# Patient Record
Sex: FEMALE | Race: OTHER | NOT HISPANIC OR LATINO
[De-identification: names, ages, dates, MRNs, and addresses within clinical notes are randomized per-mention and may not be internally consistent; named-entity substitution may affect disease eponyms.]

---

## 2020-09-01 ENCOUNTER — APPOINTMENT (OUTPATIENT)
Dept: OBGYN | Facility: CLINIC | Age: 70
End: 2020-09-01
Payer: MEDICARE

## 2020-09-01 PROCEDURE — G0101: CPT

## 2020-09-01 PROCEDURE — 99212 OFFICE O/P EST SF 10 MIN: CPT | Mod: 25

## 2020-09-01 PROCEDURE — 81002 URINALYSIS NONAUTO W/O SCOPE: CPT

## 2020-09-09 PROBLEM — Z00.00 ENCOUNTER FOR PREVENTIVE HEALTH EXAMINATION: Status: ACTIVE | Noted: 2020-09-09

## 2020-09-22 ENCOUNTER — APPOINTMENT (OUTPATIENT)
Dept: OBGYN | Facility: CLINIC | Age: 70
End: 2020-09-22
Payer: MEDICARE

## 2020-09-22 PROCEDURE — 99213 OFFICE O/P EST LOW 20 MIN: CPT

## 2020-09-22 PROCEDURE — 81002 URINALYSIS NONAUTO W/O SCOPE: CPT

## 2020-10-22 ENCOUNTER — APPOINTMENT (OUTPATIENT)
Dept: UROGYNECOLOGY | Facility: CLINIC | Age: 70
End: 2020-10-22
Payer: MEDICARE

## 2020-10-22 VITALS
HEIGHT: 66 IN | SYSTOLIC BLOOD PRESSURE: 123 MMHG | DIASTOLIC BLOOD PRESSURE: 73 MMHG | BODY MASS INDEX: 29.73 KG/M2 | HEART RATE: 66 BPM | WEIGHT: 185 LBS

## 2020-10-22 DIAGNOSIS — Z78.9 OTHER SPECIFIED HEALTH STATUS: ICD-10-CM

## 2020-10-22 DIAGNOSIS — N81.2 INCOMPLETE UTEROVAGINAL PROLAPSE: ICD-10-CM

## 2020-10-22 LAB
BILIRUB UR QL STRIP: NEGATIVE
CLARITY UR: CLEAR
COLLECTION METHOD: NORMAL
GLUCOSE UR-MCNC: NEGATIVE
HCG UR QL: NORMAL EU/DL
HGB UR QL STRIP.AUTO: NEGATIVE
KETONES UR-MCNC: NEGATIVE
LEUKOCYTE ESTERASE UR QL STRIP: NEGATIVE
NITRITE UR QL STRIP: NEGATIVE
PH UR STRIP: 6
PROT UR STRIP-MCNC: NEGATIVE
SP GR UR STRIP: 1.01

## 2020-10-22 PROCEDURE — 99205 OFFICE O/P NEW HI 60 MIN: CPT

## 2020-10-22 PROCEDURE — 51701 INSERT BLADDER CATHETER: CPT

## 2020-10-22 PROCEDURE — 81003 URINALYSIS AUTO W/O SCOPE: CPT | Mod: QW

## 2020-10-22 PROCEDURE — 99215 OFFICE O/P EST HI 40 MIN: CPT

## 2020-10-22 NOTE — PHYSICAL EXAM
[Chaperone Present] : A chaperone was present in the examining room during all aspects of the physical examination [FreeTextEntry1] : Void: 200 cc\par PVR: 405 cc\par Urethra was prepped in sterile fashion and then a sterile catheter was used by me to drain the bladder.\par \par GH: 6.5    pB: 4  TVL: 10  C: +7.5  D: -5 Aa: +3  Ba: +7.5  Ap: 0 Bp: 0\par  \par Well healed incision: mini vertical, laparoscopic\par normal perineal sensation\par normal perineal reflexes\par negative cough stress test with and without reduction\par positive atrophy\par positive urethral hypermobility\par bilateral levator ani spasm, mild tenderness\par no urethral tenderness\par no bladder tenderness\par no cervical tenderness\par 1/5 Kegel

## 2020-10-22 NOTE — COUNSELING
[FreeTextEntry1] : \par We will notify you of the urine results. If there is an infection we will treat the infection and then see if you empty your bladder better without an infection\par \par Schedule bladder function testing with my PA, Elayne (UDS with reduction), the earliest in 10 days from now.  Please come with a full bladder\par \par Please call the office if you feel like you have an infection because we can not do the bladder function testing in the setting of an infection\par \par Please schedule a surgical counseling visit with me to discuss surgery\par \par Please review the surgical handouts

## 2020-10-22 NOTE — DISCUSSION/SUMMARY
[FreeTextEntry1] : \par Uterovaginal prolapse incomplete-\par The patient was counseled regarding the possible natural progression of prolapse and the clinical consequences of worsening prolapse. The stage and the location of the prolapse was reviewed with the patient. She was counseled regarding the management strategies including observation (but if the prolapse is the cause of her not emptying well then she could develop kidney damage), pessary placement and surgery (robotic hysterectomy/sacrocolpopexy). The patient voiced understanding and agrees with the surgical plan. She will be scheduled for preoperative urodynamics and preoperative surgical counseling.\par \par Incomplete bladder emptying-\par Advised the patient that this can be due to an acute UTI. Will send her urine to rule out infectious etiology.  I will also recommend further workup with urodynamics (with reduction). Discussed with the patient that we are concerned about incomplete bladder emptying because it can cause recurrent UTI and can cause kidney damage. The patient voiced understanding.\par \par \par \par

## 2020-10-22 NOTE — HISTORY OF PRESENT ILLNESS
[FreeTextEntry1] : \par Pt with pelvic floor dysfunction here for urogynecologic evaluation. She describes: \par Referring provider: Dr Carlos Enrique Salas\par \par Chief PFD: bulge\par \par 9/1/20: pap normal, HPV neg\par 9/1/20: pelvic ultrasound: uterus 6.9cm, endometrila lining 5mm, surgically absent ovaries\par \par Pelvic organ prolapse: s/p LSC appy/BS0 colectomy (mass in bowel and cysts in ovaries excision), +bulge worsening for last few months, +splinting for bowel movements\par Stress urinary incontinence: denies\par Overactive bladder syndrome: denies\par Voiding dysfunction: denies Incomplete bladder emptying, minimal hesitancy \par Lower urinary tract/vaginal symptoms: no UTIs past year; denies hematuria, dysuria, or bladder pain \par Fecal incontinence: denies\par Defecatory dysfunction: sausage\par Sexual dysfunction: sexually active, no issues\par Pelvic pain: denies\par Vaginal dryness: denies\par \par Her pelvic floor symptoms are significantly bothersome and negatively impacting her quality of life. \par

## 2020-10-23 LAB
APPEARANCE: CLEAR
BILIRUBIN URINE: NEGATIVE
BLOOD URINE: NEGATIVE
COLOR: NORMAL
GLUCOSE QUALITATIVE U: NEGATIVE
KETONES URINE: NEGATIVE
LEUKOCYTE ESTERASE URINE: NEGATIVE
NITRITE URINE: NEGATIVE
PH URINE: 6
PROTEIN URINE: NEGATIVE
SPECIFIC GRAVITY URINE: 1.01
UROBILINOGEN URINE: NORMAL

## 2020-10-24 LAB — BACTERIA UR CULT: NORMAL

## 2020-11-09 ENCOUNTER — APPOINTMENT (OUTPATIENT)
Dept: UROGYNECOLOGY | Facility: CLINIC | Age: 70
End: 2020-11-09

## 2020-12-17 ENCOUNTER — APPOINTMENT (OUTPATIENT)
Dept: UROGYNECOLOGY | Facility: CLINIC | Age: 70
End: 2020-12-17

## 2020-12-28 ENCOUNTER — APPOINTMENT (OUTPATIENT)
Dept: UROGYNECOLOGY | Facility: CLINIC | Age: 70
End: 2020-12-28

## 2021-01-13 ENCOUNTER — NON-APPOINTMENT (OUTPATIENT)
Age: 71
End: 2021-01-13

## 2021-01-13 LAB — CYTOLOGY CVX/VAG DOC THIN PREP: NORMAL

## 2021-03-22 ENCOUNTER — RESULT CHARGE (OUTPATIENT)
Age: 71
End: 2021-03-22

## 2021-03-22 ENCOUNTER — APPOINTMENT (OUTPATIENT)
Dept: UROGYNECOLOGY | Facility: CLINIC | Age: 71
End: 2021-03-22
Payer: MEDICARE

## 2021-03-22 VITALS
HEART RATE: 66 BPM | SYSTOLIC BLOOD PRESSURE: 127 MMHG | BODY MASS INDEX: 30.53 KG/M2 | HEIGHT: 66 IN | DIASTOLIC BLOOD PRESSURE: 76 MMHG | WEIGHT: 190 LBS

## 2021-03-22 LAB
BILIRUB UR QL STRIP: NEGATIVE
CLARITY UR: CLEAR
COLLECTION METHOD: NORMAL
GLUCOSE UR-MCNC: NEGATIVE
HCG UR QL: 0.2 EU/DL
HGB UR QL STRIP.AUTO: NEGATIVE
KETONES UR-MCNC: NEGATIVE
LEUKOCYTE ESTERASE UR QL STRIP: NEGATIVE
NITRITE UR QL STRIP: NEGATIVE
PH UR STRIP: 5.5
SP GR UR STRIP: 1.02

## 2021-03-22 PROCEDURE — 81003 URINALYSIS AUTO W/O SCOPE: CPT | Mod: QW

## 2021-03-22 PROCEDURE — 51784 ANAL/URINARY MUSCLE STUDY: CPT

## 2021-03-22 PROCEDURE — 51728 CYSTOMETROGRAM W/VP: CPT

## 2021-03-22 PROCEDURE — 51741 ELECTRO-UROFLOWMETRY FIRST: CPT

## 2021-03-22 PROCEDURE — ZZZZZ: CPT

## 2021-03-22 PROCEDURE — 51797 INTRAABDOMINAL PRESSURE TEST: CPT

## 2021-03-25 ENCOUNTER — APPOINTMENT (OUTPATIENT)
Dept: UROGYNECOLOGY | Facility: CLINIC | Age: 71
End: 2021-03-25
Payer: MEDICARE

## 2021-03-25 VITALS
HEART RATE: 64 BPM | WEIGHT: 190 LBS | SYSTOLIC BLOOD PRESSURE: 128 MMHG | BODY MASS INDEX: 30.53 KG/M2 | HEIGHT: 66 IN | DIASTOLIC BLOOD PRESSURE: 67 MMHG

## 2021-03-25 PROCEDURE — 57160 INSERT PESSARY/OTHER DEVICE: CPT

## 2021-03-25 RX ORDER — CONJUGATED ESTROGENS 0.62 MG/G
0.62 CREAM VAGINAL
Qty: 1 | Refills: 3 | Status: ACTIVE | COMMUNITY
Start: 2021-03-25 | End: 1900-01-01

## 2021-03-25 NOTE — DISCUSSION/SUMMARY
[FreeTextEntry1] : Complete Uterovaginal Prolapse:\par Ring and support #6  placed without difficulty. Remained in place with Valsalva, coughing, and ambulating. \par The patient tolerated all fittings well.\par Will return for placement when pessary is ordered and arrives to the office.\par \par Incomplete bladder emptying\par Today PVR: 240cc\par Will repeat PVR with pessary in for 3 weeks\par Will review UDS results at next visit\par \par Atrophic Vaginitis\par We reviewed the risks, benefits, alternatives and indications of local estrogen therapy and I gave her a handout that covers this information. She does opt to begin this therapy. I have given her a prescription and specific instructions on how to use the estrogen cream, applied with a finger at a low dose for urogenital atrophy.\par

## 2021-03-25 NOTE — HISTORY OF PRESENT ILLNESS
[FreeTextEntry1] : Patient is here for pessary fitting. GH:6.5  TVL: 10\par Last seen 3/22/21 for UDS. \par \par 9/1/20: pap normal, HPV neg\par 9/1/20: pelvic ultrasound: uterus 6.9cm, endometrila lining 5mm, surgically absent ovaries\par \par s/p LSC appy/BS0 colectomy (mass in bowel and cysts in ovaries excision)\par sexually active, no issues\par \par GH: 6.5 pB: 4 TVL: 10 C: +7.5 D: -5 Aa: +3 Ba: +7.5 Ap: 0 Bp: 0\par \par Incomplete bladder emptying without reduction. PVR: 405cc\par \par Candidate for robotic hysterectomy sacrocolpopexy.\par \par Patient wants to wait a few months before scheduling the surgery due to COVID. Pt wants to have pessary at this time for prolapse management\par \par Today pt feels well, has no complains. \par \par \par \par

## 2021-04-01 ENCOUNTER — APPOINTMENT (OUTPATIENT)
Dept: UROGYNECOLOGY | Facility: CLINIC | Age: 71
End: 2021-04-01
Payer: MEDICARE

## 2021-04-01 VITALS
HEART RATE: 64 BPM | BODY MASS INDEX: 30.53 KG/M2 | WEIGHT: 190 LBS | DIASTOLIC BLOOD PRESSURE: 80 MMHG | HEIGHT: 66 IN | SYSTOLIC BLOOD PRESSURE: 146 MMHG

## 2021-04-01 PROCEDURE — 99213 OFFICE O/P EST LOW 20 MIN: CPT

## 2021-04-02 NOTE — DISCUSSION/SUMMARY
[FreeTextEntry1] : \par Incomplete Uterovaginal Prolapse:\par Ring and support #6 was inserted without difficulty. Pessary remained in place with valsalva, coughing, and ambulating.\par The patient tolerated this well. \par Precautions given.\par The patient will follow up in 3 weeks for routine pessary management and self teaching.\par \par Atrophic vaginitis\par cont premarin cream 3x a week

## 2021-04-02 NOTE — COUNSELING
[FreeTextEntry1] : Please call the office if you have any issues with vaginal pain, vaginal bleeding, difficulty urinating or having a bowel movement or if the pessary falls out so that we can arrange another size pessary.\par \par Please follow up for pessary maintenance in 3 weeks with TABBY Holly\[place]

## 2021-04-02 NOTE — HISTORY OF PRESENT ILLNESS
[FreeTextEntry1] : Patient is here for pessary placement.\par Last seen 3/25/21 for pessary fitting, ring and support # 6 and PVR: 240cc\par \par 9/1/20: pap normal, HPV neg\par 9/1/20: pelvic ultrasound: uterus 6.9cm, endometrila lining 5mm, surgically absent ovaries\par \par s/p LSC appy/BS0 colectomy (mass in bowel and cysts in ovaries excision)\par sexually active, no issues\par \par GH: 6.5 pB: 4 TVL: 10 C: +7.5 D: -5 Aa: +3 Ba: +7.5 Ap: 0 Bp: 0\par \par 1st visit, Incomplete bladder emptying without reduction. PVR: 405cc\par \par Candidate for robotic hysterectomy sacrocolpopexy.\par \par Patient wants to wait a few months before scheduling the surgery due to COVID. Pt wants to have pessary at this time for prolapse management\par \par Fitted: \par Ring and support #6\par \par Premarin cream \par \par Patient is doing well and has no complaints today.\par

## 2021-04-05 ENCOUNTER — APPOINTMENT (OUTPATIENT)
Dept: UROGYNECOLOGY | Facility: CLINIC | Age: 71
End: 2021-04-05
Payer: MEDICARE

## 2021-04-05 VITALS
SYSTOLIC BLOOD PRESSURE: 129 MMHG | WEIGHT: 190 LBS | BODY MASS INDEX: 30.53 KG/M2 | HEIGHT: 66 IN | HEART RATE: 75 BPM | DIASTOLIC BLOOD PRESSURE: 76 MMHG

## 2021-04-05 PROCEDURE — 57160 INSERT PESSARY/OTHER DEVICE: CPT

## 2021-04-05 NOTE — DISCUSSION/SUMMARY
[FreeTextEntry1] : \par Incomplete Uterovaginal Prolapse:\par Ring and support # 7 placed without difficulty. Remained in place with Valsalva, coughing, and ambulating. \par The patient tolerated all fittings well.\par Will return for placement when pessary is ordered and arrives to the office.\par \par Pt's own pessary ring and support # 6 cleaned and reinserted today. \par \par Atrophic vaginitis\par cont premarin cream 3x a week\par \par Incomplete bladder emptying\par Will repeat PVR with pessary in place for 2-3 weeks

## 2021-04-05 NOTE — COUNSELING
[FreeTextEntry1] : \par Lili will contact you when the pessary arrives in the office. She will then schedule an appointment for pessary placement. (Ring and support #7)\par \par Please call the office with any questions, issues, or concerns.\par

## 2021-04-05 NOTE — HISTORY OF PRESENT ILLNESS
[FreeTextEntry1] : Patient is here for second pessary fitting. GH: 6.5 TVL: 10\par Last seen 4/1/21 for pessary placement for incomplete  uterovaginal prolapse, ring and support # 6\par \par 9/1/20: pap normal, HPV neg\par 9/1/20: pelvic ultrasound: uterus 6.9cm, endometrial lining 5mm, surgically absent ovaries\par \par s/p LSC appy/BS0 colectomy (mass in bowel and cysts in ovaries excision)\par sexually active, no issues\par \par GH: 6.5 pB: 4 TVL: 10 C: +7.5 D: -5 Aa: +3 Ba: +7.5 Ap: 0 Bp: 0\par \par 1st visit, Incomplete bladder emptying without reduction. PVR: 405cc\par 3/25/21 visit, pessary fitting, PVR: 240cc\par \par Candidate for robotic hysterectomy sacrocolpopexy.\par \par Patient wants to wait a few months before scheduling the surgery due to COVID. Pt wants to have pessary at this time for prolapse management\par \par Fitted: \par Ring and support #6, fell out at home with bowel movement\par \par Premarin cream \par \par Pt states that the pessary fell out at home with bowel movement. Denies feeling constipation. Felt that she is emptying the bladder much better with pessary in place. \par

## 2021-04-29 ENCOUNTER — APPOINTMENT (OUTPATIENT)
Dept: UROGYNECOLOGY | Facility: CLINIC | Age: 71
End: 2021-04-29
Payer: MEDICARE

## 2021-04-29 VITALS
WEIGHT: 195 LBS | SYSTOLIC BLOOD PRESSURE: 114 MMHG | HEIGHT: 66 IN | DIASTOLIC BLOOD PRESSURE: 70 MMHG | HEART RATE: 82 BPM | BODY MASS INDEX: 31.34 KG/M2

## 2021-04-29 PROCEDURE — 51701 INSERT BLADDER CATHETER: CPT

## 2021-04-29 PROCEDURE — 99214 OFFICE O/P EST MOD 30 MIN: CPT | Mod: 25

## 2021-04-30 NOTE — DISCUSSION/SUMMARY
[FreeTextEntry1] : Incomplete Uterovaginal Prolapse:\par Ring and support #6 removed, cleaned, inspected and NOT reinserted (given back to pt) The patient tolerated this well. \par Ring and support # 7 inserted. \par No bleeding, lesions, abnormal discharge were noted. \par The patient will follow up in 3 months for routine pessary management.\par \par Atrophic Vaginitis:\par Advised to continue to apply a pea size amount of the cream to the opening of the vagina three nights per week.\par \par Incomplete bladder emptying\par PVR: 240cc today (with ring and support # 6)\par Will repeat PVR with Ring and support # 7 after it's in for 1 month. If PVR still elevated will schedule UDS with reduction\par

## 2021-04-30 NOTE — COUNSELING
[FreeTextEntry1] : Please call the office if you have any issues with vaginal pain, vaginal bleeding, difficulty urinating or having a bowel movement or if the pessary falls out so that we can arrange another size pessary.\par \par We will contact you if the urine results are abnormal.\par \par Please continue to apply a pea size amount of the cream to the opening of the vagina three nights per week.\par \par Please follow up for pessary maintenance in 1 month  and PVR check with TABBY Holly\par

## 2021-04-30 NOTE — HISTORY OF PRESENT ILLNESS
[FreeTextEntry1] : Patient is here for pessary placement and PVR check for incomplete uterovaginal prolapse.\par Last seen 4/5/21 for 2nd pessary fitting, ring and support # 7 \par \par 9/1/20: pap normal, HPV neg\par 9/1/20: pelvic ultrasound: uterus 6.9cm, endometrial lining 5mm, surgically absent ovaries\par \par s/p LSC appy/BS0 colectomy (mass in bowel and cysts in ovaries excision)\par sexually active, no issues\par \par GH: 6.5 pB: 4 TVL: 10 C: +7.5 D: -5 Aa: +3 Ba: +7.5 Ap: 0 Bp: 0\par \par 1st visit, Incomplete bladder emptying without reduction. PVR: 405cc\par 3/25/21 visit, pessary fitting, PVR: 240cc\par \par Candidate for robotic hysterectomy sacrocolpopexy.\par \par Patient wants to wait a few months before scheduling the surgery due to COVID. Pt wants to have pessary at this time for prolapse management\par \par Fitted: \par Ring and support #6, fell out at home with bowel movement\par Ring and support # 7\par \par Premarin cream \par \par Today, patient is doing well and has no complaints. States that the pessary ring and support # 6 stayed in this time. \par

## 2021-04-30 NOTE — PHYSICAL EXAM
[Chaperone Present] : A chaperone was present in the examining room during all aspects of the physical examination [FreeTextEntry1] : Urethra was prepped in sterile fashion and then a sterile catheter was used by me to drain the bladder.\par void: 0\par PVR: 240cc [No Acute Distress] : in no acute distress [Well developed] : well developed [Well Nourished] : ~L well nourished

## 2021-05-02 LAB — BACTERIA UR CULT: NORMAL

## 2021-06-03 ENCOUNTER — APPOINTMENT (OUTPATIENT)
Dept: UROGYNECOLOGY | Facility: CLINIC | Age: 71
End: 2021-06-03
Payer: MEDICARE

## 2021-06-03 VITALS
BODY MASS INDEX: 29.73 KG/M2 | WEIGHT: 185 LBS | HEIGHT: 66 IN | DIASTOLIC BLOOD PRESSURE: 76 MMHG | SYSTOLIC BLOOD PRESSURE: 138 MMHG | HEART RATE: 68 BPM

## 2021-06-03 PROCEDURE — 99214 OFFICE O/P EST MOD 30 MIN: CPT | Mod: 25

## 2021-06-03 PROCEDURE — 51701 INSERT BLADDER CATHETER: CPT

## 2021-06-03 NOTE — HISTORY OF PRESENT ILLNESS
[FreeTextEntry1] : Patient is here for routine pessary cleaning for incomplete uterovaginal prolapse and PVR check. \par Last seen 4/29/21 for pessary placement, ring and support # 7 and PVR check: 240\par \par 9/1/20: pap normal, HPV neg\par 9/1/20: pelvic ultrasound: uterus 6.9cm, endometrial lining 5mm, surgically absent ovaries\par \par s/p LSC appy/BS0 colectomy (mass in bowel and cysts in ovaries excision)\par sexually active, no issues\par \par GH: 6.5 pB: 4 TVL: 10 C: +7.5 D: -5 Aa: +3 Ba: +7.5 Ap: 0 Bp: 0\par \par 1st visit, Incomplete bladder emptying without reduction. PVR: 405cc\par 3/25/21 visit, pessary fitting, PVR: 240cc\par \par Candidate for robotic hysterectomy sacrocolpopexy.\par \par Patient wants to wait a few months before scheduling the surgery due to COVID. Pt wants to have pessary at this time for prolapse management\par \par Fitted: \par Ring and support #6, fell out at home with bowel movement\par Ring and support # 7\par \par Premarin cream \par \par Today, patient is doing well and has no complaints. She loves the pessary, feels that it helped her with prolapse, able to urinate better (just has to concentrate on urinating) and having better bowel movements. \par

## 2021-06-03 NOTE — COUNSELING
[FreeTextEntry1] : Schedule bladder function testing (UDS without reduction) with my TABBY Holly. \par \par Please call the office if you feel like you have an infection because we cannot do the bladder function testing in the setting of an infection.\par \par Please come with a full, not painful bladder.\par \par Please call the office if you have any issues with vaginal pain, vaginal bleeding, difficulty urinating or having a bowel movement or if the pessary falls out so that we can arrange another size pessary.\par \par Please continue to apply a pea size amount of the cream to the opening of the vagina three nights per week.\par \par Please follow up for pessary maintenance in 3 months with TABBY Holly\par

## 2021-06-03 NOTE — DISCUSSION/SUMMARY
[FreeTextEntry1] : Incomplete Uterovaginal Prolapse:\par Ring and support # 7 removed, cleaned, inspected and reinserted. The patient tolerated this well. \par No bleeding, lesions, abnormal discharge were noted. \par The patient will follow up in 3 months for routine pessary management.\par \par Atrophic Vaginitis:\par Advised to continue to apply a pea size amount of the cream to the opening of the vagina three nights per week.\par \par Incomplete Bladder Emptying\par Schedule UDS with reduction for etiology\par Consider Flomax and Flexeril after the UDS

## 2021-06-03 NOTE — PHYSICAL EXAM
[Chaperone Present] : A chaperone was present in the examining room during all aspects of the physical examination [No Acute Distress] : in no acute distress [Well developed] : well developed [Well Nourished] : ~L well nourished [FreeTextEntry1] : Urethra was prepped in sterile fashion and then a sterile catheter was used by me to drain the bladder.\par void: 0\par PVR: 205cc

## 2021-06-05 LAB — BACTERIA UR CULT: NORMAL

## 2021-07-29 ENCOUNTER — APPOINTMENT (OUTPATIENT)
Dept: UROGYNECOLOGY | Facility: CLINIC | Age: 71
End: 2021-07-29

## 2021-08-16 ENCOUNTER — APPOINTMENT (OUTPATIENT)
Dept: UROGYNECOLOGY | Facility: CLINIC | Age: 71
End: 2021-08-16
Payer: MEDICARE

## 2021-08-16 VITALS
DIASTOLIC BLOOD PRESSURE: 77 MMHG | BODY MASS INDEX: 29.73 KG/M2 | HEART RATE: 73 BPM | SYSTOLIC BLOOD PRESSURE: 130 MMHG | WEIGHT: 185 LBS | HEIGHT: 66 IN

## 2021-08-16 DIAGNOSIS — N81.2 INCOMPLETE UTEROVAGINAL PROLAPSE: ICD-10-CM

## 2021-08-16 DIAGNOSIS — R33.9 RETENTION OF URINE, UNSPECIFIED: ICD-10-CM

## 2021-08-16 DIAGNOSIS — N95.2 POSTMENOPAUSAL ATROPHIC VAGINITIS: ICD-10-CM

## 2021-08-16 PROCEDURE — 99214 OFFICE O/P EST MOD 30 MIN: CPT

## 2021-08-16 RX ORDER — CYCLOBENZAPRINE HYDROCHLORIDE 5 MG/1
5 TABLET, FILM COATED ORAL
Qty: 60 | Refills: 3 | Status: ACTIVE | COMMUNITY
Start: 2021-06-11 | End: 1900-01-01

## 2021-08-16 NOTE — DISCUSSION/SUMMARY
[FreeTextEntry1] : Incomplete Uterovaginal Prolapse:\par Ring and support # 7 removed, cleaned, inspected and reinserted. The patient tolerated this well. \par No bleeding, lesions, abnormal discharge were noted. \par The patient will follow up in 3 months for routine pessary management.\par \par Atrophic Vaginitis:\par Advised to continue to apply a pea size amount of the cream to the opening of the vagina three nights per week.\par \par STELLA\par Rx Flexeril 5mg BID\par Will do PVR in 6 weeks.

## 2021-08-16 NOTE — HISTORY OF PRESENT ILLNESS
[FreeTextEntry1] : Patient is here for routine pessary cleaning for incomplete uterovaginal prolapse and PVR check. \par Last seen 6/3/21 for pessary cleaning. ring and support # 7\par  \par 9/1/20: pap normal, HPV neg\par 9/1/20: pelvic ultrasound: uterus 6.9cm, endometrial lining 5mm, surgically absent ovaries\par \par s/p LSC appy/BS0 colectomy (mass in bowel and cysts in ovaries excision)\par sexually active, no issues\par \par GH: 6.5 pB: 4 TVL: 10 C: +7.5 D: -5 Aa: +3 Ba: +7.5 Ap: 0 Bp: 0\par \par 1st visit, Incomplete bladder emptying without reduction. PVR: 405cc\par 3/25/21 visit, pessary fitting, PVR: 240cc\par 4/29/21, PVR: 240cc \par 6/3/21: PVR: 205cc\par \par Candidate for robotic hysterectomy sacrocolpopexy.\par \par Patient wants to wait a few months before scheduling the surgery due to COVID. Pt wants to have pessary at this time for prolapse management\par \par Fitted: \par Ring and support #6, fell out at home with bowel movement\par Ring and support # 7\par \par Premarin cream \par \par 3/2021, UDS, showed that she would empty her bladder better with prolapse reduction. Will add Flexeril 5mg BID to see if she empties her bladder better with pessary and muscle relaxant.\par \par Flexeril 5mg BID\par \par Today, patient is doing well and has no complaints. She did not start Flexeril 5mg as she did not get a call from her pharmacy. States that her phone wasn't working for some time. \par

## 2021-08-16 NOTE — COUNSELING
[FreeTextEntry1] : Please call the office if you have any issues with vaginal pain, vaginal bleeding, difficulty urinating or having a bowel movement or if the pessary falls out so that we can arrange another size pessary.\par \par Please continue to apply a pea size amount of the cream to the opening of the vagina three nights per week.\par \par Please follow up for pessary maintenance in 3 months with TABBY Holly\par \par If you feel like you have an infection it is important for you to call our office and we will arrange testing of your urine.\par \par Please begin taking Flexeril 5 mg twice a day. It takes up to 6 weeks to go into full effect. Please  your refill when you complete the 1st bottle. It can make you sleepy. \par \par Please call my office if you have any issues with the cost or side effects of the medication. \par \par Schedule a 6 weeks follow up med check appointment to see how well you empty your bladder. \par

## 2021-10-07 ENCOUNTER — APPOINTMENT (OUTPATIENT)
Dept: UROGYNECOLOGY | Facility: CLINIC | Age: 71
End: 2021-10-07

## 2021-11-10 ENCOUNTER — NON-APPOINTMENT (OUTPATIENT)
Age: 71
End: 2021-11-10

## 2021-11-11 ENCOUNTER — APPOINTMENT (OUTPATIENT)
Dept: UROGYNECOLOGY | Facility: CLINIC | Age: 71
End: 2021-11-11

## 2021-11-22 ENCOUNTER — APPOINTMENT (OUTPATIENT)
Dept: OBGYN | Facility: CLINIC | Age: 71
End: 2021-11-22